# Patient Record
Sex: FEMALE | Race: WHITE | NOT HISPANIC OR LATINO | URBAN - NONMETROPOLITAN AREA
[De-identification: names, ages, dates, MRNs, and addresses within clinical notes are randomized per-mention and may not be internally consistent; named-entity substitution may affect disease eponyms.]

---

## 2018-10-02 ENCOUNTER — OFFICE VISIT (OUTPATIENT)
Dept: URBAN - NONMETROPOLITAN AREA CLINIC 18 | Facility: CLINIC | Age: 43
End: 2018-10-02
Payer: COMMERCIAL

## 2018-10-02 DIAGNOSIS — H52.223 REGULAR ASTIGMATISM, BILATERAL: Primary | ICD-10-CM

## 2018-10-02 DIAGNOSIS — H16.143 PUNCTATE KERATITIS, BILATERAL: ICD-10-CM

## 2018-10-02 DIAGNOSIS — Q13.2: ICD-10-CM

## 2018-10-02 PROCEDURE — 92015 DETERMINE REFRACTIVE STATE: CPT | Performed by: OPTOMETRIST

## 2018-10-02 PROCEDURE — 92014 COMPRE OPH EXAM EST PT 1/>: CPT | Performed by: OPTOMETRIST

## 2018-10-02 ASSESSMENT — INTRAOCULAR PRESSURE
OD: 16
OS: 18

## 2018-10-02 ASSESSMENT — VISUAL ACUITY
OS: 20/50
OD: 20/30

## 2018-10-02 NOTE — IMPRESSION/PLAN
Impression: Punctate keratitis, bilateral: H16.143. Plan: Discussed condition with patient in detail. Minor reduction in acuity secondary to cornea status. Recommend treatment with Preservative Free Artificial tears QID (Systane or Refresh Brand recommended). RTC if symptoms continue.

## 2018-10-02 NOTE — IMPRESSION/PLAN
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 Plan: Small nevus. Patient to monitor and RTC if any changes observed.

## 2019-10-23 ENCOUNTER — OFFICE VISIT (OUTPATIENT)
Dept: URBAN - NONMETROPOLITAN AREA CLINIC 18 | Facility: CLINIC | Age: 44
End: 2019-10-23
Payer: COMMERCIAL

## 2019-10-23 DIAGNOSIS — H52.13 MYOPIA, BILATERAL: Primary | ICD-10-CM

## 2019-10-23 DIAGNOSIS — H50.34 INTERMITTENT ALTERNATING EXOTROPIA: ICD-10-CM

## 2019-10-23 DIAGNOSIS — H25.13 AGE-RELATED NUCLEAR CATARACT, BILATERAL: ICD-10-CM

## 2019-10-23 PROCEDURE — 92014 COMPRE OPH EXAM EST PT 1/>: CPT | Performed by: OPTOMETRIST

## 2019-10-23 ASSESSMENT — INTRAOCULAR PRESSURE
OS: 14
OD: 16

## 2019-10-23 ASSESSMENT — VISUAL ACUITY
OS: 20/50
OD: 20/30

## 2019-10-23 NOTE — IMPRESSION/PLAN
Impression: Punctate keratitis, bilateral: H16.143. Plan: Discussed condition with patient in detail. Minor reduction in acuity secondary to cornea status. Recommend treatment with Preservative Free Artificial tears TID (Systane or Refresh Brand recommended). RTC if symptoms continue.